# Patient Record
Sex: MALE | Race: WHITE | HISPANIC OR LATINO
[De-identification: names, ages, dates, MRNs, and addresses within clinical notes are randomized per-mention and may not be internally consistent; named-entity substitution may affect disease eponyms.]

---

## 2017-05-13 ENCOUNTER — HOSPITAL (OUTPATIENT)
Dept: OTHER | Age: 82
End: 2017-05-13
Attending: EMERGENCY MEDICINE

## 2018-08-25 ENCOUNTER — APPOINTMENT (OUTPATIENT)
Dept: GENERAL RADIOLOGY | Age: 83
End: 2018-08-25
Attending: PHYSICIAN ASSISTANT
Payer: MEDICARE

## 2018-08-25 ENCOUNTER — HOSPITAL ENCOUNTER (OUTPATIENT)
Age: 83
Discharge: HOME | End: 2018-08-25
Payer: MEDICARE

## 2018-08-25 VITALS
OXYGEN SATURATION: 97 % | WEIGHT: 165 LBS | HEART RATE: 82 BPM | HEIGHT: 68 IN | RESPIRATION RATE: 20 BRPM | SYSTOLIC BLOOD PRESSURE: 134 MMHG | TEMPERATURE: 98 F | BODY MASS INDEX: 25.01 KG/M2 | DIASTOLIC BLOOD PRESSURE: 67 MMHG

## 2018-08-25 DIAGNOSIS — M19.072 OSTEOARTHRITIS OF LEFT ANKLE, UNSPECIFIED OSTEOARTHRITIS TYPE: Primary | ICD-10-CM

## 2018-08-25 PROCEDURE — 99203 OFFICE O/P NEW LOW 30 MIN: CPT

## 2018-08-25 PROCEDURE — 73610 X-RAY EXAM OF ANKLE: CPT | Performed by: PHYSICIAN ASSISTANT

## 2018-08-25 PROCEDURE — 73630 X-RAY EXAM OF FOOT: CPT | Performed by: PHYSICIAN ASSISTANT

## 2018-08-25 RX ORDER — GLIMEPIRIDE 4 MG/1
4 TABLET ORAL
COMMUNITY

## 2018-08-25 RX ORDER — ATORVASTATIN CALCIUM 20 MG/1
20 TABLET, FILM COATED ORAL NIGHTLY
COMMUNITY

## 2018-08-25 RX ORDER — AMLODIPINE BESYLATE 5 MG/1
5 TABLET ORAL DAILY
COMMUNITY

## 2018-08-25 RX ORDER — LISINOPRIL 20 MG/1
20 TABLET ORAL DAILY
COMMUNITY

## 2018-08-25 RX ORDER — LEVOTHYROXINE SODIUM 0.03 MG/1
25 TABLET ORAL
COMMUNITY

## 2018-08-25 NOTE — ED PROVIDER NOTES
Patient Seen in: [facility]    History   Patient presents with:  Musculoskeletal Problem    Stated Complaint: LEG PAIN/DIABETIC    HPI    61-year-old male here with complaint of left foot and and ankle pain that is intermittent. 1040]  BP: 128/68  Pulse: 79  Resp: 20  Temp: 97.6 °F (36.4 °C)  Temp src: Temporal  SpO2: 95 %  O2 Device: None (Room air)    Current:/68   Pulse 79   Temp 97.6 °F (36.4 °C) (Temporal)   Resp 20   Ht 172.7 cm (5' 8\")   Wt 74.8 kg   SpO2 95%   BMI 2 on 8/25/2018 at 11:48     Approved by: Jose Angel Hooks MD            Xr Foot, Complete (min 3 Views), Left (cpt=73630)    Result Date: 8/25/2018  PROCEDURE:  XR FOOT, COMPLETE (MIN 3 VIEWS), LEFT (CPT=73630)  TECHNIQUE:  AP, oblique, and lateral views were o [id number]  [phone]              Medications Prescribed:  Current Discharge Medication List

## 2018-08-25 NOTE — ED INITIAL ASSESSMENT (HPI)
Patient presents with cc of left foot/ankle restlessness /hot tingling to bottom for over a year now. Stopped taking his insulin.+DM. Noncompliant-last [id number]=49